# Patient Record
Sex: MALE
[De-identification: names, ages, dates, MRNs, and addresses within clinical notes are randomized per-mention and may not be internally consistent; named-entity substitution may affect disease eponyms.]

---

## 2017-04-10 ENCOUNTER — HOSPITAL ENCOUNTER (EMERGENCY)
Dept: HOSPITAL 14 - H.ER | Age: 27
Discharge: HOME | End: 2017-04-10
Payer: COMMERCIAL

## 2017-04-10 VITALS
HEART RATE: 90 BPM | OXYGEN SATURATION: 98 % | RESPIRATION RATE: 17 BRPM | SYSTOLIC BLOOD PRESSURE: 122 MMHG | DIASTOLIC BLOOD PRESSURE: 58 MMHG | TEMPERATURE: 98.7 F

## 2017-04-10 DIAGNOSIS — R10.32: Primary | ICD-10-CM

## 2017-04-10 NOTE — ED PDOC
HPI: Abdomen


Time Seen by Provider: 04/10/17 01:36


Chief Complaint (Nursing): Groin Pain


Chief Complaint (Provider): groin pain


History Per: Patient


Additional Complaint(s): 


pt c/o L groin pain on and off x several months, worse today.  no fall or 

injury.  no fever, cp, sob, abd pain, n/v, urinary c/o, testicle pain, numbness

, weakness distally. 





Past Medical History


Reviewed: Historical Data, Nursing Documentation, Vital Signs


Vital Signs: 





 Last Vital Signs











Temp  98.7 F   04/10/17 01:28


 


Pulse  90   04/10/17 01:28


 


Resp  17   04/10/17 01:28


 


BP  122/58 L  04/10/17 01:28


 


Pulse Ox  98   04/10/17 01:28














- Medical History


PMH: No Chronic Diseases





- Family History


Family History: States: No Known Family Hx


   Denies: MI, CAD





- Social History


Current smoker - smoking cessation education provided: No


Alcohol: None


Drugs: Denies





- Immunization History


Hx Tetanus Toxoid Vaccination: No


Hx Influenza Vaccination: No


Hx Pneumococcal Vaccination: No





- Home Medications


Home Medications: 


 Ambulatory Orders











 Medication  Instructions  Recorded


 


Cyclobenzaprine [Flexeril] 10 mg PO BID #25 tab 01/25/17


 


Naproxen [Naprosyn] 500 mg PO BID #20 tab 01/25/17


 


Naproxen [Naprosyn] 500 mg PO BID #20 tablet 04/10/17














- Allergies


Allergies/Adverse Reactions: 


 Allergies











Allergy/AdvReac Type Severity Reaction Status Date / Time


 


No Known Allergies Allergy   Unverified 01/25/17 15:06














Review of Systems


ROS Statement: Except As Marked, All Systems Reviewed And Found Negative





Physical Exam





- Reviewed


Nursing Documentation Reviewed: Yes


Vital Signs Reviewed: Yes





- Physical Exam


Appears: Positive for: Non-toxic, Uncomfortable


Skin: Positive for: Normal Color, Warm, DRY


Cardiovascular/Chest: Positive for: Regular Rate, Rhythm


Respiratory: Positive for: CNT, Normal Breath Sounds


Gastrointestinal/Abdominal: Positive for: Normal Exam, Bowel Sounds, Soft.  

Negative for: Tenderness


Male Genital Exam: Positive for: normal genitalia, no hernia, inguinal 

tenderness (left).  Negative for: epididymal tenderness, hernia mass, lesions, 

scrotum tenderness (R), scrotum tenderness (L), testicular tenderness (R), 

testicular tenderness (L)


Extremity: Positive for: Normal ROM.  Negative for: Tenderness


Neurologic/Psych: Positive for: Alert, Oriented.  Negative for: Motor/Sensory 

Deficits





- ECG


O2 Sat by Pulse Oximetry: 98





Disposition





- Clinical Impression


Clinical Impression: 


 Groin strain





- Patient ED Disposition


Is Patient to be Admitted: No





- Disposition


Referrals: 


[facility] [Outside]


Disposition: Routine/Home


Disposition Time: 01:53


Condition: GOOD


Prescriptions: 


Naproxen [Naprosyn] 500 mg PO BID #20 tablet


Instructions:  Groin Strain (ED)


Forms:  [facility] ED School/Work Excuse

## 2017-09-25 ENCOUNTER — HOSPITAL ENCOUNTER (EMERGENCY)
Dept: HOSPITAL 14 - H.ER | Age: 27
Discharge: LEFT BEFORE DISCHARGE | End: 2017-09-25
Payer: COMMERCIAL

## 2017-09-25 VITALS
TEMPERATURE: 98.8 F | OXYGEN SATURATION: 97 % | HEART RATE: 94 BPM | RESPIRATION RATE: 18 BRPM | DIASTOLIC BLOOD PRESSURE: 62 MMHG | SYSTOLIC BLOOD PRESSURE: 121 MMHG

## 2017-09-25 DIAGNOSIS — R51: Primary | ICD-10-CM

## 2017-09-25 DIAGNOSIS — H53.9: ICD-10-CM

## 2017-09-25 LAB
ALBUMIN/GLOB SERPL: 1.6 {RATIO} (ref 1–2.1)
ALP SERPL-CCNC: 86 U/L (ref 38–126)
ALT SERPL-CCNC: 27 U/L (ref 21–72)
APTT BLD: 32.4 SECONDS (ref 25.6–37.1)
AST SERPL-CCNC: 22 U/L (ref 17–59)
BASOPHILS # BLD AUTO: 0.1 K/UL (ref 0–0.2)
BASOPHILS NFR BLD: 0.6 % (ref 0–2)
BILIRUB SERPL-MCNC: 0.8 MG/DL (ref 0.2–1.3)
BUN SERPL-MCNC: 10 MG/DL (ref 9–20)
CALCIUM SERPL-MCNC: 9.2 MG/DL (ref 8.4–10.2)
CHLORIDE SERPL-SCNC: 104 MMOL/L (ref 98–107)
CHOLEST SERPL-MCNC: 179 MG/DL (ref 0–199)
CO2 SERPL-SCNC: 25 MMOL/L (ref 22–30)
EOSINOPHIL # BLD AUTO: 0.1 K/UL (ref 0–0.7)
EOSINOPHIL NFR BLD: 0.6 % (ref 0–4)
ERYTHROCYTE [DISTWIDTH] IN BLOOD BY AUTOMATED COUNT: 13.1 % (ref 11.5–14.5)
GLOBULIN SER-MCNC: 2.9 GM/DL (ref 2.2–3.9)
GLUCOSE SERPL-MCNC: 89 MG/DL (ref 75–110)
HCT VFR BLD CALC: 38.3 % (ref 35–51)
LYMPHOCYTES # BLD AUTO: 2.2 K/UL (ref 1–4.3)
LYMPHOCYTES NFR BLD AUTO: 26.2 % (ref 20–40)
MCH RBC QN AUTO: 28.6 PG (ref 27–31)
MCHC RBC AUTO-ENTMCNC: 33.3 G/DL (ref 33–37)
MCV RBC AUTO: 86.1 FL (ref 80–94)
MONOCYTES # BLD: 0.4 K/UL (ref 0–0.8)
MONOCYTES NFR BLD: 4.7 % (ref 0–10)
NEUTROPHILS # BLD: 5.8 K/UL (ref 1.8–7)
NEUTROPHILS NFR BLD AUTO: 67.9 % (ref 50–75)
NRBC BLD AUTO-RTO: 0 % (ref 0–0)
PLATELET # BLD: 278 K/UL (ref 130–400)
PMV BLD AUTO: 8.5 FL (ref 7.2–11.7)
POTASSIUM SERPL-SCNC: 4 MMOL/L (ref 3.6–5)
PROT SERPL-MCNC: 7.5 G/DL (ref 6.3–8.2)
SODIUM SERPL-SCNC: 144 MMOL/L (ref 132–148)
WBC # BLD AUTO: 8.6 K/UL (ref 4.8–10.8)

## 2017-09-25 NOTE — ED PDOC
- Laboratory Results


Result Diagrams: 


 09/25/17 18:56





 09/25/17 18:56





- ECG


O2 Sat by Pulse Oximetry: 97





Medical Decision Making


Medical Decision Making: 


Receiving Sign Out:


Patient signed out to me by Dr. Shafer pending labs and admission.





--------------------------------------------------------------------------------

----------------- 


Scribe Attestation:


Documented by Christy Peck, acting as a scribe for Mohamud Valladares MD.





Provider Scribe Attestation:


All medical record entries made by the Scribe were at my direction and 

personally dictated by me. I have reviewed the chart and agree that the record 

accurately reflects my personal performance of the history, physical exam, 

medical decision making, and the department course for this patient. I have 

also personally directed, reviewed, and agree with the discharge instructions 

and disposition.





Disposition


Counseled Patient/Family Regarding: Studies Performed, Diagnosis





- Clinical Impression


Clinical Impression: 


 Acute headache, Vision disturbance, Left against medical advice








- POA


Present On Arrival: None





- Disposition


Referrals: 


 Service [Outside]


Dewayne Edwards MD [Staff Provider] - 


Disposition: Routine/Home


Disposition Time: 20:00


Condition: STABLE


Additional Instructions: 


follow up with neurologist as soon as possible


return to the ED immediately if you would like to continue the workup or with 

any worsening or concerning symptoms


Instructions:  Acute Headache (ED), Against Medical Advice (ED)


Forms:  [facility] (English)





Progress Note





- Review of Symptoms


Events since last encounter: 





2020


Labs reviewed and indicate elevated triglycerides, otherwise within normal 

limits.


Findings discussed with patient and informed of admission plan. Pt refuses 

admission and states he has to go to work tomorrow.


Informed patient about the risks of signing out AMA and he expresses 

understanding, still wishes to sign out AMA.


Patient given outpatient neurologist follow up and informed to return to ED if 

symptoms worsen or new symptoms arise.





Against Medical Advice





- AMA


Patient Left Against Medical Advice: 


The patient declines admission to the hospital and wishes to leave the 

Emergency Department.  This action is against my medical advice. This decision 

was made with informed refusal. The patient was told that admission to the 

hospital is necessary.  Explanation of the reasons why were discussed.  


The risks of leaving were explained to the patient and include, but are not 

limited to, worsening of known or currently unknown conditions, permanent 

disability and death from undiagnosed or untreated conditions. 


The patient has the capacity to make this informed decision and understands my 

explanation of the current medical problem and risks of leaving. 


The patient voluntarily accepts these risks and signed an AMA form documenting 

our conversation.


The patient was given the opportunity to ask questions and reconsider.  The 

patient was encouraged to return to the Emergency Department at any time for 

further care.

## 2017-09-25 NOTE — ED PDOC
HPI:STROKE





- Time


Time: 18:05





- Historian


Historian: Patient





- Chief Complaint


Chief Complaint: Numbness, Vision loss, other (headache)





- Onset


Date: 09/25/17


Time: 11:00 (11am at work)


Onset: This morning





- Timing


Timing: Improved





- Location


Locate right:: Upper extremity





- Severity of pain


Maximum severity:: Moderate


Severity Current: Moderate (headache)





- Quality of Pain


Quality of Pain:: Sharp





- Exacerbated by


Exacerbated by:: Nothing





- Relieved by


Relieved by:: Nothing





- TPA


Positive for Contraindication: Yes


Reason tPA is not being Administered: symptoms ongoing >4hrs, NIHSS <4 and 

improvement





- Notes:


Notes:: 





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 

in a warehouse. Symptoms accompanied by right arm paresthesias and right eye 

visual changes- described as blurry vision. Denies weakness, incoordination or 

change in speech. 





States has a history of migranes and was hospitalized as a child for complex 

migrane with possible vision loss.  He denies recent head injury, drug abuse, 

fever, palpitations or chest pain. He took advil prior to arrival with minimal 

relief. 











NIHSS Stroke Scale





- Date/Time Evaluation Performed


Date Performed: 09/25/17


Time Performed: 18:06





- How Severe is the Stroke


Level of Consciousness: 0=Alert


LOC to Questions: 0=Both comments correct


LOC to commands: 0=Obeys both correctly


Best Gaze: 0=Normal


Visual: 0=No visual loss


Facial: 0=Normal


Motor Arm - Left: 0=No drift


Motor Arm - Right: 0=No drift


Motor Leg - Left: 0=No drift


Motor Leg - Right: 0=No drift


Limb Ataxia: 0=Absent


Sensory: 0=Normal


Best Language: 0=No aphasia


Dysarthia: 0=Normal articulation


Extinction & Inattention (Neglect): 0=Normal, no object


Score: 0





rTPA Inclusion/Exclusion





- Refusal of Treatment


Patient Refused Treatment: No





- Inclusion Criteria for Altepase


Patient is 18 years or Older: Yes


The Clinical Diagnosis of Ischemic Stroke That is Causing a Potentially 

Disabling Neurological Deficit: Yes


Time of Onset is Well Established to be Less Than 270 Minute Before Treatment 

Would Begin: No


Risk/Benefit Discussed With Patient/Family Member Present: Yes





- Warning to TPA With Conditions


Following Conditions Weighed Against Anticipated Benefit: Yes


Condition: Stroke Serevity Too Mild, Rapid Improvement





Past Medical History


Reviewed: Historical Data, Nursing Documentation, Vital Signs


Vital Signs: 





 Last Vital Signs











Temp  98.8 F   09/25/17 17:46


 


Pulse  94 H  09/25/17 17:46


 


Resp  18   09/25/17 17:46


 


BP  121/62   09/25/17 17:46


 


Pulse Ox  97   09/25/17 17:46














- Medical History


PMH: No Chronic Diseases


Other PMH: ? migrane history never formally worked up other than childhood





- Surgical History


Surgical History: No Surg Hx





- Family History


Family History: 


   Denies: MI, CAD





- Living Arrangements


Living Arrangements: With Family





- Social History


Current smoker - smoking cessation education provided: Yes


Alcohol: Occasional


Drugs: Denies





- Immunization History


Hx Tetanus Toxoid Vaccination: No


Hx Influenza Vaccination: No


Hx Pneumococcal Vaccination: No





- Home Medications


Home Medications: 


 Ambulatory Orders











 Medication  Instructions  Recorded


 


Cyclobenzaprine [Flexeril] 10 mg PO BID #25 tab 01/25/17


 


Naproxen [Naprosyn] 500 mg PO BID #20 tab 01/25/17


 


Naproxen [Naprosyn] 500 mg PO BID #20 tablet 04/10/17














- Allergies


Allergies/Adverse Reactions: 


 Allergies











Allergy/AdvReac Type Severity Reaction Status Date / Time


 


No Known Allergies Allergy   Unverified 01/25/17 15:06














Review of Systems


ROS Statement: Except As Marked, All Systems Reviewed And Found Negative


Constitutional: Negative for: Fever, Chills


Eyes: Positive for: Vision Change.  Negative for: Pain, Eyelid Inflammation, 

Redness


ENT: Negative for: Ear Discharge, Nose Discharge, Throat Pain, Throat Swelling


Cardiovascular: Negative for: Chest Pain, Palpitations


Respiratory: Negative for: Shortness of Breath


Gastrointestinal: Negative for: Nausea, Vomiting, Abdominal Pain


Genitourinary Male: Negative for: Dysuria, Incontinence


Musculoskeletal: Negative for: Neck Pain, Arm Pain, Back Pain, Leg Pain


Skin: Negative for: Rash, Lesions, Jaundice


Neurological: Positive for: Numbness, Headache.  Negative for: Weakness, 

Incoordination, Change in Speech, Altered Mental Status, Dizziness


Psych: Negative for: Anxiety, Depression





Physical Exam





- Reviewed


Nursing Documentation Reviewed: Yes


Vital Signs Reviewed: Yes





- Physical Exam


Appears: Positive for: Well, Non-toxic, No Acute Distress


Head Exam: Positive for: ATRAUMATIC, NORMAL INSPECTION, NORMOCEPHALIC


Skin: Positive for: Normal Color, Warm, DRY


Eye Exam: Positive for: Normal appearance, EOMI, PERRL, Other (wears glasses, R 

eye soft nontender on palpation of globe w closed eye, no injection, pupils 3mm 

b/l, peripheral vision intact grossly)


ENT: Positive for: Normal ENT Inspection


Neck: Positive for: Normal, Painless ROM


Cardiovascular/Chest: Positive for: Regular Rate, Rhythm


Respiratory: Positive for: CNT, Normal Breath Sounds


Gastrointestinal/Abdominal: Positive for: Bowel Sounds, Soft.  Negative for: 

Tenderness, Guarding


Back: Positive for: Normal Inspection


Extremity: Positive for: Normal ROM


Neurologic/Psych: Positive for: Alert, Oriented.  Negative for: Motor/Sensory 

Deficits





- ECG


O2 Sat by Pulse Oximetry: 97


Pulse Ox Interpretation: Normal





Medical Decision Making


Medical Decision Making: 





Workup for CVA vs complex migrane initiated.


CT brain, labs and EKG





CT brain report reviewed





Accession No. : [id number]


Patient Name / ID : COMPA GUO  / [id number]


Exam Date : 09/25/2017 18:40:21 ( Approved )


Study Comment : 


Sex / Age : M  / 27Y





Creator : LYNETTE PEREZ MD


Dictator : LYNETTE PEREZ MD


 : 


Approver : LYNETTE PEREZ MD


Approver2 : 





Report Date : 09/25/2017 18:50:46


My Comment : 


********************************************************************************

***





PROCEDURE:  CT HEAD WITHOUT CONTRAST.





HISTORY:


posterior headache with R vision change x7 hrs





COMPARISON:


None available. 





TECHNIQUE:


Axial computed tomography images were obtained through the head/brain without 

intravenous contrast.  





Radiation dose:





Total exam DLP = 823.33 mGy-cm.





This CT exam was performed using one or more of the following dose reduction 

techniques: Automated exposure control, adjustment of the mA and/or kV 

according to patient size, and/or use of iterative reconstruction technique.





FINDINGS:





HEMORRHAGE:


No intracranial hemorrhage. 





BRAIN:


Gray-white matter differentiation is preserved.  There is no mass, mass effect 

or abnormal extra-axial fluid collection.





VENTRICLES:


The ventricles are normal in size, shape and configuration.





CALVARIUM:


The skull base and calvarium are normal.





PARANASAL SINUSES:


Predominantly clear.





MASTOID AIR CELLS:


Predominantly clear.





OTHER FINDINGS:


None.





IMPRESSION:


No acute intracranial abnormality.   If there is a persistent focal neurologic 

deficit and an ongoing clinical concern for acute infarction, an MRI of the 

brain without intravenous contrast would be a more sensitive modality for 

evaluation of hyperacute/acute ischemic infarction.





===============





Discussed w neurology on call Dr Cardona, recommends initiating depakote 500mg, 

decadron and Mag Sulfate 2gm. Also rec ASA 81mg, admit for full workup.





Endorse Dr Valladares 710pm pending labs and admit. 





Disposition





- Clinical Impression


Clinical Impression: 


 Acute headache, Vision disturbance








- Patient ED Disposition


Is Patient to be Admitted: No


Counseled Patient/Family Regarding: Studies Performed





- Disposition


Disposition: Transfer of Care


Disposition Time: 19:07


Condition: STABLE


Instructions:  Acute Headache (ED)


Forms:  [facility] (English)


Patient Signed Over To: Mohamud Valladares


Handoff Comments: pending labs, likely obs tele

## 2017-09-25 NOTE — CT
PROCEDURE:  CT HEAD WITHOUT CONTRAST.



HISTORY:

posterior headache with R vision change x7 hrs



COMPARISON:

None available. 



TECHNIQUE:

Axial computed tomography images were obtained through the head/brain 

without intravenous contrast.  



Radiation dose:



Total exam DLP = 823.33 mGy-cm.



This CT exam was performed using one or more of the following dose 

reduction techniques: Automated exposure control, adjustment of the 

mA and/or kV according to patient size, and/or use of iterative 

reconstruction technique.



FINDINGS:



HEMORRHAGE:

No intracranial hemorrhage. 



BRAIN:

Gray-white matter differentiation is preserved.  There is no mass, 

mass effect or abnormal extra-axial fluid collection.



VENTRICLES:

The ventricles are normal in size, shape and configuration.



CALVARIUM:

The skull base and calvarium are normal.



PARANASAL SINUSES:

Predominantly clear.



MASTOID AIR CELLS:

Predominantly clear.



OTHER FINDINGS:

None.



IMPRESSION:

No acute intracranial abnormality.   If there is a persistent focal 

neurologic deficit and an ongoing clinical concern for acute 

infarction, an MRI of the brain without intravenous contrast would be 

a more sensitive modality for evaluation of hyperacute/acute ischemic 

infarction.

## 2017-09-27 NOTE — CARD
--------------- APPROVED REPORT --------------





EKG Measurement

Heart [id number]

MO [id number]

[id number]

[id number]

[id number]



<Conclusion>

Normal sinus rhythm

Normal ECG

## 2018-07-18 ENCOUNTER — HOSPITAL ENCOUNTER (EMERGENCY)
Dept: HOSPITAL 14 - H.ER | Age: 28
Discharge: HOME | End: 2018-07-18
Payer: COMMERCIAL

## 2018-07-18 VITALS
TEMPERATURE: 98.8 F | SYSTOLIC BLOOD PRESSURE: 110 MMHG | RESPIRATION RATE: 18 BRPM | OXYGEN SATURATION: 97 % | HEART RATE: 88 BPM | DIASTOLIC BLOOD PRESSURE: 67 MMHG

## 2018-07-18 DIAGNOSIS — F17.200: ICD-10-CM

## 2018-07-18 DIAGNOSIS — J06.9: Primary | ICD-10-CM

## 2018-07-18 NOTE — ED PDOC
HPI: CCC, URI, Sore Throat


Time Seen by Provider: 07/18/18 01:25


Chief Complaint (Nursing): ENT Problem


Chief Complaint (Provider): cough, sore throat


History Per: Patient


History/Exam Limitations: no limitations


Onset/Duration Of Symptoms: Days (3)


Current Symptoms Are (Timing): Still Present


Location Of Pain: Throat


Associated Symptoms: Sore Throat, Cough, Sputum, Nasal Congestion


Additional Complaint(s): 





27 y/o male presents for evaluation of cold symptoms x 3 days.  Patient reports 

nasal congestion, dry cough (which is worse at night), and throat pain. Denies 

fever, chest pain, shortness of breath, palpitations, nausea/vomiting, 

abdominal pain, recent travel, sick contacts.  





Past Medical History


Reviewed: Historical Data, Nursing Documentation, Vital Signs


Vital Signs: 


 Last Vital Signs











Temp  98.8 F   07/18/18 00:41


 


Pulse  88   07/18/18 00:41


 


Resp  18   07/18/18 00:41


 


BP  110/67   07/18/18 00:41


 


Pulse Ox  97   07/18/18 01:37














- Medical History


PMH: No Chronic Diseases





- Surgical History


Surgical History: No Surg Hx





- Family History


Family History: States: No Known Family Hx


   Denies: MI, CAD





- Social History


Current smoker - smoking cessation education provided: Yes


Alcohol: None


Drugs: Denies





- Immunization History


Hx Tetanus Toxoid Vaccination: No


Hx Influenza Vaccination: No


Hx Pneumococcal Vaccination: No





- Home Medications


Home Medications: 


 Ambulatory Orders











 Medication  Instructions  Recorded


 


Cyclobenzaprine [Flexeril] 10 mg PO BID #25 tab 01/25/17


 


Naproxen [Naprosyn] 500 mg PO BID #20 tab 01/25/17


 


Naproxen [Naprosyn] 500 mg PO BID #20 tablet 04/10/17


 


Ibuprofen [Motrin] 600 mg PO Q6 #20 tab 10/23/17


 


Fluticasone Nasal [Flonase] 1 actuation NS BID #1 bottle 07/18/18


 


Ibuprofen [Motrin Tab] 1 tab PO Q6 PRN #15 tab 07/18/18


 


guaiFENesin/Dextromethorphan 1 - 2 tab PO Q12 PRN #20 tab 07/18/18





[guaiFENesin/-30 mg]  














- Allergies


Allergies/Adverse Reactions: 


 Allergies











Allergy/AdvReac Type Severity Reaction Status Date / Time


 


No Known Allergies Allergy   Verified 07/18/18 00:40














Review of Systems


ROS Statement: Except As Marked, All Systems Reviewed And Found Negative


ENT: Positive for: Nose Congestion, Throat Pain


Respiratory: Positive for: Cough





Physical Exam





- Reviewed


Nursing Documentation Reviewed: Yes


Vital Signs Reviewed: Yes





- Physical Exam


Appears: Positive for: Well, Non-toxic, No Acute Distress


Head Exam: Positive for: ATRAUMATIC, NORMAL INSPECTION, NORMOCEPHALIC


Skin: Positive for: Normal Color


Eye Exam: Positive for: Normal appearance


ENT: Positive for: TM Is/Are (clear b/l), Nasal Congestion, Pharyngeal 

Erythema.  Negative for: Tonsillar Exudate, Tonsillar Swelling


Cardiovascular/Chest: Positive for: Regular Rate, Rhythm


Respiratory: Positive for: Normal Breath Sounds


Gastrointestinal/Abdominal: Positive for: Normal Exam


Back: Positive for: Normal Inspection


Extremity: Positive for: Normal ROM


Neurologic/Psych: Positive for: Alert, Oriented (x3)





- ECG


O2 Sat by Pulse Oximetry: 97





- Progress


ED Course And Treament: 





rapid strep, ibuprofen PO








Patient educated on findings, discharged with rx Flonase, ibuprofen, mucinex DM


Advised fluids, rest


Follow up PMD 2-3 days.


Return precautions given








Disposition





- Clinical Impression


Clinical Impression: 


 URI (upper respiratory infection)








- Patient ED Disposition


Is Patient to be Admitted: No


Counseled Patient/Family Regarding: Studies Performed, Diagnosis, Need For 

Followup, Rx Given





- Disposition


Referrals: 


Mary Garduno MD [Primary Care Provider] - 


Disposition: Routine/Home


Disposition Time: 02:43


Condition: IMPROVED


Prescriptions: 


Fluticasone Nasal [Flonase] 1 actuation NS BID #1 bottle


guaiFENesin/Dextromethorphan [guaiFENesin/-30 mg] 1 - 2 tab PO Q12 PRN #

20 tab


 PRN Reason: cough and congestion


Ibuprofen [Motrin Tab] 1 tab PO Q6 PRN #15 tab


 PRN Reason: Pain, Moderate (4-7)


Instructions:  Viral Upper Respiratory Infection, Adult (DC)


Forms:  [facility] (English)

## 2023-10-08 NOTE — RAD
PROCEDURE:  CHEST RADIOGRAPH, 1 VIEW



HISTORY:

headache



COMPARISON:

None available.



FINDINGS:



LUNGS:

Clear.



PLEURA:

No pneumothorax or pleural fluid seen.



CARDIOVASCULAR:

Normal.



OSSEOUS STRUCTURES:

No significant abnormalities.



VISUALIZED UPPER ABDOMEN:

Normal.



OTHER FINDINGS:

None. 



IMPRESSION:

No acute cardiopulmonary disease appreciated. Rock Becker